# Patient Record
Sex: MALE | Race: WHITE | NOT HISPANIC OR LATINO | URBAN - METROPOLITAN AREA
[De-identification: names, ages, dates, MRNs, and addresses within clinical notes are randomized per-mention and may not be internally consistent; named-entity substitution may affect disease eponyms.]

---

## 2023-09-11 PROBLEM — G47.50 PARASOMNIA: Status: ACTIVE | Noted: 2023-09-11

## 2023-09-11 PROBLEM — E53.8 FOLIC ACID DEFICIENCY: Status: ACTIVE | Noted: 2023-09-11

## 2023-09-11 PROBLEM — G25.81 RESTLESS LEG SYNDROME: Status: ACTIVE | Noted: 2023-09-11

## 2023-09-11 PROBLEM — R20.9 SKIN SENSATION DISTURBANCE: Status: ACTIVE | Noted: 2023-09-11

## 2023-09-11 PROBLEM — E66.9 OBESITY: Status: ACTIVE | Noted: 2023-09-11

## 2024-06-11 PROBLEM — E03.9 HYPOTHYROIDISM: Status: RESOLVED | Noted: 2024-06-11 | Resolved: 2024-06-11

## 2024-06-17 ENCOUNTER — APPOINTMENT (OUTPATIENT)
Dept: PRIMARY CARE | Facility: CLINIC | Age: 69
End: 2024-06-17
Payer: MEDICARE

## 2024-07-18 ENCOUNTER — OFFICE VISIT (OUTPATIENT)
Dept: PRIMARY CARE | Facility: CLINIC | Age: 69
End: 2024-07-18
Payer: MEDICARE

## 2024-07-18 ENCOUNTER — LAB (OUTPATIENT)
Dept: LAB | Facility: LAB | Age: 69
End: 2024-07-18
Payer: MEDICARE

## 2024-07-18 VITALS
DIASTOLIC BLOOD PRESSURE: 84 MMHG | HEIGHT: 69 IN | BODY MASS INDEX: 31.99 KG/M2 | SYSTOLIC BLOOD PRESSURE: 144 MMHG | HEART RATE: 53 BPM | WEIGHT: 216 LBS | OXYGEN SATURATION: 97 % | TEMPERATURE: 97 F

## 2024-07-18 DIAGNOSIS — Z00.00 ROUTINE GENERAL MEDICAL EXAMINATION AT HEALTH CARE FACILITY: Primary | ICD-10-CM

## 2024-07-18 DIAGNOSIS — Z13.220 SCREENING FOR CHOLESTEROL LEVEL: ICD-10-CM

## 2024-07-18 DIAGNOSIS — R03.0 PREHYPERTENSION: ICD-10-CM

## 2024-07-18 DIAGNOSIS — Z00.00 ANNUAL PHYSICAL EXAM: ICD-10-CM

## 2024-07-18 DIAGNOSIS — Z12.11 ENCOUNTER FOR SCREENING FOR MALIGNANT NEOPLASM OF COLON: ICD-10-CM

## 2024-07-18 DIAGNOSIS — Z12.5 SCREENING FOR PROSTATE CANCER: ICD-10-CM

## 2024-07-18 DIAGNOSIS — E55.9 VITAMIN D DEFICIENCY: ICD-10-CM

## 2024-07-18 DIAGNOSIS — I83.93 VARICOSE VEINS OF BOTH LOWER EXTREMITIES, UNSPECIFIED WHETHER COMPLICATED: ICD-10-CM

## 2024-07-18 LAB
25(OH)D3 SERPL-MCNC: 38 NG/ML (ref 31–100)
ALBUMIN SERPL-MCNC: 4.2 G/DL (ref 3.5–5)
ALP BLD-CCNC: 126 U/L (ref 35–125)
ALT SERPL-CCNC: 14 U/L (ref 5–40)
ANION GAP SERPL CALC-SCNC: 11 MMOL/L
AST SERPL-CCNC: 18 U/L (ref 5–40)
BILIRUB SERPL-MCNC: 0.5 MG/DL (ref 0.1–1.2)
BUN SERPL-MCNC: 15 MG/DL (ref 8–25)
CALCIUM SERPL-MCNC: 9.4 MG/DL (ref 8.5–10.4)
CHLORIDE SERPL-SCNC: 101 MMOL/L (ref 97–107)
CHOLEST SERPL-MCNC: 183 MG/DL (ref 133–200)
CHOLEST/HDLC SERPL: 4.9 {RATIO}
CO2 SERPL-SCNC: 27 MMOL/L (ref 24–31)
CREAT SERPL-MCNC: 1.1 MG/DL (ref 0.4–1.6)
EGFRCR SERPLBLD CKD-EPI 2021: 73 ML/MIN/1.73M*2
ERYTHROCYTE [DISTWIDTH] IN BLOOD BY AUTOMATED COUNT: 12.4 % (ref 11.5–14.5)
GLUCOSE SERPL-MCNC: 96 MG/DL (ref 65–99)
HCT VFR BLD AUTO: 48 % (ref 41–52)
HDLC SERPL-MCNC: 37 MG/DL
HGB BLD-MCNC: 15.8 G/DL (ref 13.5–17.5)
LDLC SERPL CALC-MCNC: 113 MG/DL (ref 65–130)
MCH RBC QN AUTO: 29.7 PG (ref 26–34)
MCHC RBC AUTO-ENTMCNC: 32.9 G/DL (ref 32–36)
MCV RBC AUTO: 90 FL (ref 80–100)
NRBC BLD-RTO: 0 /100 WBCS (ref 0–0)
PLATELET # BLD AUTO: 275 X10*3/UL (ref 150–450)
POTASSIUM SERPL-SCNC: 4.8 MMOL/L (ref 3.4–5.1)
PROT SERPL-MCNC: 6.8 G/DL (ref 5.9–7.9)
PSA SERPL-MCNC: 1 NG/ML
RBC # BLD AUTO: 5.32 X10*6/UL (ref 4.5–5.9)
SODIUM SERPL-SCNC: 139 MMOL/L (ref 133–145)
TRIGL SERPL-MCNC: 167 MG/DL (ref 40–150)
WBC # BLD AUTO: 8.2 X10*3/UL (ref 4.4–11.3)

## 2024-07-18 PROCEDURE — G0439 PPPS, SUBSEQ VISIT: HCPCS | Performed by: INTERNAL MEDICINE

## 2024-07-18 PROCEDURE — 36415 COLL VENOUS BLD VENIPUNCTURE: CPT

## 2024-07-18 PROCEDURE — 99213 OFFICE O/P EST LOW 20 MIN: CPT | Performed by: INTERNAL MEDICINE

## 2024-07-18 PROCEDURE — 3008F BODY MASS INDEX DOCD: CPT | Performed by: INTERNAL MEDICINE

## 2024-07-18 PROCEDURE — 1123F ACP DISCUSS/DSCN MKR DOCD: CPT | Performed by: INTERNAL MEDICINE

## 2024-07-18 PROCEDURE — 99215 OFFICE O/P EST HI 40 MIN: CPT | Performed by: INTERNAL MEDICINE

## 2024-07-18 PROCEDURE — 1036F TOBACCO NON-USER: CPT | Performed by: INTERNAL MEDICINE

## 2024-07-18 PROCEDURE — 1159F MED LIST DOCD IN RCRD: CPT | Performed by: INTERNAL MEDICINE

## 2024-07-18 PROCEDURE — 1124F ACP DISCUSS-NO DSCNMKR DOCD: CPT | Performed by: INTERNAL MEDICINE

## 2024-07-18 PROCEDURE — 1125F AMNT PAIN NOTED PAIN PRSNT: CPT | Performed by: INTERNAL MEDICINE

## 2024-07-18 ASSESSMENT — PAIN SCALES - GENERAL: PAINLEVEL: 2

## 2024-07-18 ASSESSMENT — PATIENT HEALTH QUESTIONNAIRE - PHQ9
SUM OF ALL RESPONSES TO PHQ9 QUESTIONS 1 AND 2: 0
2. FEELING DOWN, DEPRESSED OR HOPELESS: NOT AT ALL
1. LITTLE INTEREST OR PLEASURE IN DOING THINGS: NOT AT ALL

## 2024-07-18 NOTE — PROGRESS NOTES
[facility]: [facility]  PROGRESS NOTE      Finn Josue is a 69 y.o. male that is being seen  today for Annual Exam (CPE/Left foot pain for a few months, injured during basketball still hurts.).  Subjective   Pt. Is being seen for annual physical exam.Pt. has been doing well.Advance directives discussed with patient .  He is full code and he makes his own medical decisions.  Denies any hospitalisation or urgent care visit.  Previous Labs reviewed .  Pt. Is upto date on  vaccination  Denies any falls or hospitalisation.     Patient is a 69-year-old male with no significant past medical history who is being seen for annual physical examination.  Patient complains of mild pain in his left foot on the inner aspect.  Denies any injury.  Patient does play basketball.  Denies any difficulty with walking.  Patient is able to bear weight on it.  Patient is due for his blood work to be done.  Patient is up-to-date on vaccination.  Patient is due for screening for colon cancer.      ROS  Negative for fever or chills  Negative for sore throat, ear pain, nasal discharge  Negative for cough, shortness of breath or wheezing  Negative for chest pain, palpitations, swelling of legs  Negative for abdominal pain, constipation, diarrhea, blood in the stools  Negative for urinary complaints  Negative for headache, dizziness, weakness or numbness  Positive for mild left foot pain  Negative for depression or anxiety  All other systems reviewed and were negative   Vitals:    07/18/24 1329   BP: 144/84   Pulse: 53   Temp: 36.1 °C (97 °F)   SpO2: 97%      Vitals:    07/18/24 1329   Weight: 98 kg (216 lb)     Body mass index is 31.9 kg/m².  Physical Exam  Constitutional: Patient does not appear to be in any acute distress  Head and Face: NCAT  Eyes: Normal external exam, EOMI  ENT: Normal external inspection of ears and nose. Oropharynx normal.  Cardiovascular: RRR, S1/S2, no murmurs, rubs, or gallops, radial  pulses +2, no edema of extremities  Pulmonary: CTAB, no respiratory distress.  Abdomen: +BS, soft, non-tender, nondistended, no guarding or rebound, no masses noted  MSK: No joint swelling, normal movements of all extremities. Range of motion- normal.  Skin- No lesions, contusions, or erythema.  Peripheral puslses palpable bilaterally 2+  Neuro: AAO X3, Cranial nerves 2-12 grossly intact,DTR 2+ in all 4 limbs   Psychiatric: Judgment intact. Appropriate mood and behavior    LABS   [unfilled]  Lab Results   Component Value Date    GLUCOSE 105 (H) 06/26/2023    CALCIUM 9.1 06/26/2023     06/26/2023    K 4.7 06/26/2023    CO2 25 06/26/2023     06/26/2023    BUN 14 06/26/2023    CREATININE 1.2 06/26/2023     Lab Results   Component Value Date    ALT 18 06/26/2023    AST 20 06/26/2023    ALKPHOS 86 06/26/2023    BILITOT 0.6 06/26/2023     Lab Results   Component Value Date    WBC 5.5 06/26/2023    HGB 16.6 (H) 06/26/2023    HCT 49.3 06/26/2023    MCV 87.6 06/26/2023     06/26/2023     Lab Results   Component Value Date    CHOL 174 06/26/2023    CHOL 190 01/25/2021    CHOL 168 04/05/2018     Lab Results   Component Value Date    HDL 42 06/26/2023    HDL 42 01/25/2021    HDL 34 (L) 04/05/2018     Lab Results   Component Value Date    LDLCALC 112 06/26/2023    LDLCALC 128 01/25/2021    LDLCALC 110 04/05/2018     Lab Results   Component Value Date    TRIG 98 06/26/2023    TRIG 100 01/25/2021    TRIG 121 04/05/2018     Lab Results   Component Value Date    HGBA1C 5.6 05/02/2022     Other labs not included in the list above were reviewed either before or during this encounter.    History    Past Medical History:   Diagnosis Date    Dizziness     Folic acid deficiency     Hypothyroidism 06/11/2024    Insomnia 11/12/2012    Last Assessment & Plan:    Formatting of this note might be different from the original.   Condition: Not currently under treatment      Follow up in: one month    Localized osteoarthrosis,  lower leg 10/08/2008    Pain in joint, lower leg 04/17/2008    Parasomnia     Primary snoring 11/12/2012    Restless leg syndrome      History reviewed. No pertinent surgical history.  Family History   Problem Relation Name Age of Onset    Heart attack Mother      Alcohol abuse Father      No Known Problems Sister      Heart attack Brother      No Known Problems Brother       No Known Allergies  No current outpatient medications on file prior to visit.     No current facility-administered medications on file prior to visit.     Immunization History   Administered Date(s) Administered    Moderna SARS-CoV-2 Vaccination 03/09/2021, 04/06/2021    Pneumococcal conjugate vaccine, 20-valent (PREVNAR 20) 06/12/2023    Tdap vaccine, age 7 year and older (BOOSTRIX, ADACEL) 03/14/2008, 01/07/2022    Zoster vaccine, recombinant, adult (SHINGRIX) 12/11/2023, 02/21/2024     Patient's medical history was reviewed and updated either before or during this encounter.  ASSESSMENT / PLAN:  Diagnoses and all orders for this visit:  Routine general medical examination at health care facility  Annual physical exam  Screening for cholesterol level  -     Lipid Panel; Future  Encounter for screening for malignant neoplasm of colon  -     Cologuard® colon cancer screening; Future  Screening for prostate cancer  -     Prostate Specific Antigen; Future  Vitamin D deficiency  -     Vitamin D 25-Hydroxy,Total (for eval of Vitamin D levels); Future  Prehypertension  -     CBC; Future  -     Comprehensive Metabolic Panel; Future    Patient is being seen for annual physical examination.  Patient blood pressure was mildly elevated.  Patient recommended to cut down salt in his diet.  Patient is due for his blood work to be done.  Patient is also due for screening for colon cancer.  Patient does not want colonoscopy to be done.  Patient is willing to have Cologuard test.      Pollo Medeiros MD

## 2024-07-19 ENCOUNTER — TELEPHONE (OUTPATIENT)
Dept: PRIMARY CARE | Facility: CLINIC | Age: 69
End: 2024-07-19
Payer: MEDICARE

## 2024-07-19 NOTE — TELEPHONE ENCOUNTER
----- Message from Pollo Medeiros sent at 7/19/2024  8:10 AM EDT -----  All labs are stable,no change in meds

## 2024-08-04 LAB — NONINV COLON CA DNA+OCC BLD SCRN STL QL: NEGATIVE

## 2024-08-05 ENCOUNTER — TELEPHONE (OUTPATIENT)
Dept: PRIMARY CARE | Facility: CLINIC | Age: 69
End: 2024-08-05
Payer: MEDICARE

## 2025-07-24 ENCOUNTER — APPOINTMENT (OUTPATIENT)
Dept: PRIMARY CARE | Facility: CLINIC | Age: 70
End: 2025-07-24
Payer: MEDICARE